# Patient Record
Sex: MALE
[De-identification: names, ages, dates, MRNs, and addresses within clinical notes are randomized per-mention and may not be internally consistent; named-entity substitution may affect disease eponyms.]

---

## 2020-08-17 ENCOUNTER — HOSPITAL ENCOUNTER (EMERGENCY)
Dept: HOSPITAL 5 - ED | Age: 25
Discharge: HOME | End: 2020-08-17
Payer: SELF-PAY

## 2020-08-17 VITALS — DIASTOLIC BLOOD PRESSURE: 78 MMHG | SYSTOLIC BLOOD PRESSURE: 150 MMHG

## 2020-08-17 DIAGNOSIS — K02.9: Primary | ICD-10-CM

## 2020-08-17 PROCEDURE — 99282 EMERGENCY DEPT VISIT SF MDM: CPT

## 2020-08-17 NOTE — EMERGENCY DEPARTMENT REPORT
ED General Adult HPI





- General


Chief complaint: Dental/Oral


Stated complaint: TOOTH PAIN


Time Seen by Provider: 08/17/20 16:06


Source: patient


Mode of arrival: Ambulatory


Limitations: No Limitations





- History of Present Illness


Initial comments: 





25-year-old -American male patient presents with complaints of right 

upper dental pain x 9 days.  Patient states at the start of his symptoms he was 

seen at [facility] ED and received a prescription for penicillin.  Patient states 

his symptoms improved with the penicillin, however once he finished the p

rescription 2 days ago his symptoms began to worsen.  He rates his pain as a 

8/10 in severity.  He does report he is in the process of making a follow-up 

appointment with a dental specialist.  He denies any fever/chills/sweats, facial

swelling, or difficulty swallowing.


-: Sudden





- Related Data


                                  Previous Rx's











 Medication  Instructions  Recorded  Last Taken  Type


 


Clindamycin [Clindamycin CAP] 300 mg PO Q6H 10 Days #40 capsule 08/17/20 Unknown

Rx


 


Ibuprofen [Motrin 800 MG tab] 800 mg PO Q8HR PRN #21 tablet 08/17/20 Unknown Rx











                                    Allergies











Allergy/AdvReac Type Severity Reaction Status Date / Time


 


No Known Allergies Allergy   Unverified 08/17/20 14:46














ED Review of Systems


ROS: 


Stated complaint: TOOTH PAIN


Other details as noted in HPI





Constitutional: denies: chills, fever


ENT: dental pain.  denies: throat pain


Respiratory: denies: cough


Gastrointestinal: denies: nausea, vomiting


Skin: denies: rash, lesions


Neurological: denies: headache





ED Past Medical Hx





- Social History


Smoking Status: Never Smoker


Substance Use Type: None





- Medications


Home Medications: 


                                Home Medications











 Medication  Instructions  Recorded  Confirmed  Last Taken  Type


 


Clindamycin [Clindamycin CAP] 300 mg PO Q6H 10 Days #40 capsule 08/17/20  

Unknown Rx


 


Ibuprofen [Motrin 800 MG tab] 800 mg PO Q8HR PRN #21 tablet 08/17/20  Unknown Rx














ED Physical Exam





- General


Limitations: No Limitations


General appearance: alert, in no apparent distress





- Head


Head exam: Present: atraumatic, normocephalic





- Eye


Eye exam: Present: normal appearance





- Expanded ENT Exam


  ** Expanded


Mouth exam: Absent: drooling, trismus, muffled voice


Teeth exam: Present: dental caries





                            __________________________














                            __________________________





 1 - Dental Tenderness (Deep dental caries noted with mild surrounding erythema,

no swelling noted to gums or face)





Throat exam: Negative: tonsillar erythema, tonsillomegaly





- Neck


Neck exam: Present: normal inspection.  Absent: lymphadenopathy





- Respiratory


Respiratory exam: Absent: respiratory distress





- Cardiovascular


Cardiovascular Exam: Present: regular rate





- Neurological Exam


Neurological exam: Present: alert, oriented X3





- Psychiatric


Psychiatric exam: Present: normal affect, normal mood





- Skin


Skin exam: Present: warm, dry, intact, normal color.  Absent: rash





ED Course





                                   Vital Signs











  08/17/20





  14:38


 


Temperature 98.0 F


 


Pulse Rate 79


 


Respiratory 18





Rate 


 


Blood Pressure 150/78


 


O2 Sat by Pulse 100





Oximetry 














ED Medical Decision Making





- Medical Decision Making





Patient here with worsening right upper dental pain after finishing a penicillin

prescription for dental infection.  Given failure on penicillin, clindamycin 

prescribed and patient informed to follow-up immediately with his dental 

specialist.  His vitals are normal, he is well-appearing, and stable for 

discharge home.  Strict return precautions were discussed in detail with patient

verbalizes understanding.


Critical care attestation.: 


If time is entered above; I have spent that time in minutes in the direct care 

of this critically ill patient, excluding procedure time.








ED Disposition


Clinical Impression: 


 Dental infection, Dental caries





Disposition: DC-01 TO HOME OR SELFCARE


Is pt being admited?: No


Condition: Stable


Instructions:  Toothache (ED), Dental Abscess (ED)


Additional Instructions: 


Please follow-up with your dental specialist within 2 days.


Prescriptions: 


Clindamycin [Clindamycin CAP] 300 mg PO Q6H 10 Days #40 capsule


Ibuprofen [Motrin 800 MG tab] 800 mg PO Q8HR PRN #21 tablet


 PRN Reason: pain